# Patient Record
Sex: MALE | Race: WHITE | NOT HISPANIC OR LATINO | URBAN - METROPOLITAN AREA
[De-identification: names, ages, dates, MRNs, and addresses within clinical notes are randomized per-mention and may not be internally consistent; named-entity substitution may affect disease eponyms.]

---

## 2019-09-24 ENCOUNTER — INPATIENT (INPATIENT)
Facility: HOSPITAL | Age: 59
LOS: 0 days | Discharge: HOME | DRG: 247 | End: 2019-09-25
Attending: HOSPITALIST | Admitting: INTERNAL MEDICINE
Payer: SELF-PAY

## 2019-09-24 VITALS
HEART RATE: 63 BPM | RESPIRATION RATE: 16 BRPM | HEIGHT: 67 IN | WEIGHT: 154.98 LBS | SYSTOLIC BLOOD PRESSURE: 127 MMHG | TEMPERATURE: 98 F | OXYGEN SATURATION: 97 % | DIASTOLIC BLOOD PRESSURE: 75 MMHG

## 2019-09-24 DIAGNOSIS — Z98.890 OTHER SPECIFIED POSTPROCEDURAL STATES: Chronic | ICD-10-CM

## 2019-09-24 DIAGNOSIS — F17.200 NICOTINE DEPENDENCE, UNSPECIFIED, UNCOMPLICATED: ICD-10-CM

## 2019-09-24 DIAGNOSIS — I21.4 NON-ST ELEVATION (NSTEMI) MYOCARDIAL INFARCTION: ICD-10-CM

## 2019-09-24 PROCEDURE — 99223 1ST HOSP IP/OBS HIGH 75: CPT

## 2019-09-24 PROCEDURE — 99152 MOD SED SAME PHYS/QHP 5/>YRS: CPT | Mod: GC

## 2019-09-24 PROCEDURE — 92941 PRQ TRLML REVSC TOT OCCL AMI: CPT | Mod: RC,GC

## 2019-09-24 PROCEDURE — 92978 ENDOLUMINL IVUS OCT C 1ST: CPT | Mod: 26,RC,GC

## 2019-09-24 PROCEDURE — 93571 IV DOP VEL&/PRESS C FLO 1ST: CPT | Mod: 26,RC,GC

## 2019-09-24 PROCEDURE — 93458 L HRT ARTERY/VENTRICLE ANGIO: CPT | Mod: 26,59,GC

## 2019-09-24 PROCEDURE — 93010 ELECTROCARDIOGRAM REPORT: CPT | Mod: 76

## 2019-09-24 RX ORDER — ASCORBIC ACID 60 MG
500 TABLET,CHEWABLE ORAL DAILY
Refills: 0 | Status: DISCONTINUED | OUTPATIENT
Start: 2019-09-24 | End: 2019-09-25

## 2019-09-24 RX ORDER — ASPIRIN/CALCIUM CARB/MAGNESIUM 324 MG
81 TABLET ORAL DAILY
Refills: 0 | Status: DISCONTINUED | OUTPATIENT
Start: 2019-09-24 | End: 2019-09-25

## 2019-09-24 RX ORDER — SODIUM CHLORIDE 9 MG/ML
1000 INJECTION INTRAMUSCULAR; INTRAVENOUS; SUBCUTANEOUS
Refills: 0 | Status: DISCONTINUED | OUTPATIENT
Start: 2019-09-24 | End: 2019-09-25

## 2019-09-24 RX ORDER — CLOPIDOGREL BISULFATE 75 MG/1
75 TABLET, FILM COATED ORAL DAILY
Refills: 0 | Status: DISCONTINUED | OUTPATIENT
Start: 2019-09-25 | End: 2019-09-25

## 2019-09-24 RX ORDER — SODIUM CHLORIDE 9 MG/ML
3 INJECTION INTRAMUSCULAR; INTRAVENOUS; SUBCUTANEOUS EVERY 8 HOURS
Refills: 0 | Status: DISCONTINUED | OUTPATIENT
Start: 2019-09-24 | End: 2019-09-25

## 2019-09-24 RX ORDER — INFLUENZA VIRUS VACCINE 15; 15; 15; 15 UG/.5ML; UG/.5ML; UG/.5ML; UG/.5ML
0.5 SUSPENSION INTRAMUSCULAR ONCE
Refills: 0 | Status: COMPLETED | OUTPATIENT
Start: 2019-09-24 | End: 2019-09-24

## 2019-09-24 RX ORDER — NICOTINE POLACRILEX 2 MG
1 GUM BUCCAL DAILY
Refills: 0 | Status: DISCONTINUED | OUTPATIENT
Start: 2019-09-24 | End: 2019-09-25

## 2019-09-24 RX ORDER — THIAMINE MONONITRATE (VIT B1) 100 MG
1 TABLET ORAL
Qty: 0 | Refills: 0 | DISCHARGE

## 2019-09-24 RX ORDER — ATORVASTATIN CALCIUM 80 MG/1
80 TABLET, FILM COATED ORAL AT BEDTIME
Refills: 0 | Status: DISCONTINUED | OUTPATIENT
Start: 2019-09-24 | End: 2019-09-25

## 2019-09-24 RX ORDER — THIAMINE MONONITRATE (VIT B1) 100 MG
100 TABLET ORAL DAILY
Refills: 0 | Status: DISCONTINUED | OUTPATIENT
Start: 2019-09-24 | End: 2019-09-25

## 2019-09-24 RX ORDER — METOPROLOL TARTRATE 50 MG
25 TABLET ORAL
Refills: 0 | Status: DISCONTINUED | OUTPATIENT
Start: 2019-09-24 | End: 2019-09-25

## 2019-09-24 RX ORDER — ASCORBIC ACID 60 MG
1 TABLET,CHEWABLE ORAL
Qty: 0 | Refills: 0 | DISCHARGE

## 2019-09-24 RX ADMIN — SODIUM CHLORIDE 3 MILLILITER(S): 9 INJECTION INTRAMUSCULAR; INTRAVENOUS; SUBCUTANEOUS at 18:59

## 2019-09-24 RX ADMIN — Medication 25 MILLIGRAM(S): at 19:55

## 2019-09-24 RX ADMIN — Medication 1 PATCH: at 19:56

## 2019-09-24 RX ADMIN — SODIUM CHLORIDE 250 MILLILITER(S): 9 INJECTION INTRAMUSCULAR; INTRAVENOUS; SUBCUTANEOUS at 13:00

## 2019-09-24 RX ADMIN — SODIUM CHLORIDE 3 MILLILITER(S): 9 INJECTION INTRAMUSCULAR; INTRAVENOUS; SUBCUTANEOUS at 21:47

## 2019-09-24 RX ADMIN — ATORVASTATIN CALCIUM 80 MILLIGRAM(S): 80 TABLET, FILM COATED ORAL at 21:49

## 2019-09-24 NOTE — H&P ADULT - ASSESSMENT
NIGHT HOSPITALIST:   S/P cardiac catheterization by cardiology with resolved RIGHT hand oedema following procedure with intact pulses in the setting of patient with non ST elevation MI NIGHT HOSPITALIST:   S/P cardiac catheterization by cardiology with resolved RIGHT hand oedema following procedure with intact pulses in the setting of patient with non ST elevation MI, with heavy tobacco use now on Nicoderm patch.  Likely has COPD, but at present S/P cardiac cath with no clinical evidence of COPD exacerbation.   Will continue with patient's Plavix, ASA, full dose Lipitor and lopressor 25 BID.    For follow up by interventional cardiology in the AM.   Patient will need to arrange for an outpatient internist...

## 2019-09-24 NOTE — H&P ADULT - NSHPPHYSICALEXAM_GEN_ALL_CORE
Physical exam with a middle aged, chronically ill appearing M in no acute distress.    Afebrile.  HR  60  RR 14.  BP  139/77  95% on RA    HEENT< PERRL< EOMI, oropharynx clear  Neck supple   Chest clear  Cor s1 s2  Abdomen soft nontender, normal bowel sounds, no rebound  Ext with RIGHT hand S/P radial approach, full pulses, resolved oedema.  skin dry.  Neurologic exam AxOx3.  Speech fluent.  Cognition intact.  UE/LE 5/5.  NO SI/HI.

## 2019-09-24 NOTE — CHART NOTE - NSCHARTNOTEFT_GEN_A_CORE
s/p JORGE x2 (prox and mRCA 95% lesion), 60% in pLAD, mild disease in LCx  Right radial band site no hematoma or ecchymosis, VSS  pt agreed life style modification includes smoking cessation, ASA, Plavix, Statin and f/u care.

## 2019-09-24 NOTE — H&P ADULT - NSHPLABSRESULTS_GEN_ALL_CORE
Labs from [facility] reviewed:    9/23/19 :   WBC 10.3.  Hgb 14.7.  Platelets of 262K.    Urine tox screen nil.    9/22/19 with K 4.2.  Cr 1.3.    Chest radiograph report from [facility] with no focal consolidation from 9/22/19    EKG tracing reviewed with NSR at 65 with nonspecific T changes.

## 2019-09-24 NOTE — H&P ADULT - NSHPREVIEWOFSYSTEMS_GEN_ALL_CORE
No chest pain/pressure.  NO palpitations.  No HA, no focal weakness.  NO hand pain, wrist pain.  NO back pain, no tearing back pain.  No abdominal pain, no red blood per rectum or melena.    No weight loss or anorexia.  No dysuria, no hematuria.  NO rash.  No joint pain.  No SI/HI.

## 2019-09-24 NOTE — H&P ADULT - ATTENDING COMMENTS
NIGHT HOSPITALIST:   Patient / family aware of course and agree with plan/care as above.  Emotional support provided to patient.  Care reviewed with covering NP/PAElsy.  Care assumed by the DAY HOSPITALIST.    Adrián Perez MD  [phone]

## 2019-09-24 NOTE — H&P ADULT - NSHPSOCIALHISTORY_GEN_ALL_CORE
1-2 drinks twice a month.  Negative CAGE screen.   Still smokes 1.5 packs daily but agrees to nicotine patch and will continue abstinence.  .  Works as an .  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

## 2019-09-24 NOTE — H&P ADULT - PROBLEM SELECTOR PLAN 1
S/P cardiac cath--follow up on official report.  Cardiology to follow.  On ASA, Lipitor, beta blocker.

## 2019-09-24 NOTE — H&P ADULT - HISTORY OF PRESENT ILLNESS
NIGHT HOSPITALIST:  Patient UNKNOWN to me previously, assigned to me at this point by Dr. Moore of the Interventional Cardiology group at [place] to admit this 60 y/o M--S/P cardiac catheterization RIGHT radial approach following initial presentation at [facility] on 9/22/19 for chest pressure but had started 5 days earlier with patient assuming muscular pain--patient does not see a regular physician.  Patient with elevated troponin assay and transferred to [place] today and is S/P cardiac cath.   Patient with apparently transient RIGHT hand oedema following the cardiac cath, now resolved. NIGHT HOSPITALIST:  Patient UNKNOWN to me previously, assigned to me at this point by Dr. JESSIE Moore of the Interventional Cardiology group at [place] to admit this 58 y/o M--S/P cardiac catheterization RIGHT radial approach following initial presentation at [facility] on 9/22/19 for chest pressure but had started 5 days earlier with patient assuming muscular pain--patient does not see a regular physician.  Patient with elevated troponin assay and transferred to [place] today and is S/P cardiac cath.   Patient with apparently transient RIGHT hand oedema following the cardiac cath, now resolved. NIGHT HOSPITALIST:  Patient UNKNOWN to me previously, assigned to me at this point by Dr. JESSIE Moore of the Interventional Cardiology group at [place] to admit this 60 y/o M--records from [facility] reviewed--S/P cardiac catheterization RIGHT radial approach following initial presentation at [facility] on 9/22/19 for chest pressure but had started 5 days earlier with patient assuming muscular pain--patient does not see a regular physician.  Patient with elevated troponin assay and transferred to [place] today and is S/P cardiac cath.   Patient with apparently transient RIGHT hand oedema following the cardiac cath, now resolved. NIGHT HOSPITALIST:  Patient UNKNOWN to me previously, assigned to me at this point by Dr. JESSIE Moore of the Interventional Cardiology group at [place] to admit this 58 y/o M--records from [facility] reviewed--S/P cardiac catheterization RIGHT radial approach following initial presentation at [facility] on 9/22/19 for chest pressure but had started 5 days earlier with patient assuming muscular pain--patient does not see a regular physician.  Patient with elevated troponin assay and transferred to [place] today and is S/P cardiac cath.   Patient with apparently transient RIGHT hand oedema following the cardiac cath, now resolved.    [facility] reviewed S/P cardiac cath.

## 2019-09-24 NOTE — H&P CARDIOLOGY - HISTORY OF PRESENT ILLNESS
60 yo male no implantable device with no significant pMHx (not seen doctor about 10 years), current smoker 1.5p/45 years) presented to [facility] ER on 9/22 am for 15/10 heavy left sided chest pain radiated to left shoulder and back. Pt reports this pain comes & goes, actually started on 9/17 thought is was muscular pain went to urgent care on 9/21 advised to go ER. Then this pain got worse Saturday night to Sunday am, decided to go ER. 58 yo male no implantable device with no significant pMHx (not seen doctor about 10 years), current smoker 1.5p/45 years) presented to [facility] ER on 9/22 am for 15/10 heavy left sided chest pain radiated to left shoulder and back. Pt reports this pain comes & goes, actually started on 9/17 thought is was muscular pain went to urgent care on 9/21 advised to go ER. Then this pain got worse Saturday night to Sunday am, decided to go ER. Pt had elevated troponin, now transferred here for cardiac cath. Pt reports his pain got relieved with medication given in ER, however, this pain still comes and goes, had pain last night and this am which lasted half a minutes.   Of note; EMS endorsed that pt was under CIWA protocol, however, pt states very clearly he only drinks 1-2 glasses beer or wine on the weekend. Last drink was more than a week ago. Also no ETOH records or CIWA protocol found on the transfer record.     Total Chol 315, , HDL 37, ,   Echo on 9/23/19: EF 60%, trivial pericardial effusion, mild MR, mild thickening of the anterior and posterior mitral valve leaflets.   CXR 9/22/19: No focal consolidation, left shoulder xray: no fx or dislocation  9/23/19 WBC 10.3, H/H 14/44, plt 262 Troponin 0.285<-0.308<-0.127, , CKMB 2.1, BUN/Cr 10/1.0 58 yo male no implantable device with no significant pMHx (not seen doctor about 10 years), current smoker 1.5p/45 years) presented to [facility] ER on 9/22 am for 15/10 heavy left sided chest pain radiated to left shoulder and back. Pt reports this pain comes & goes, actually started on 9/17 thought is was muscular pain went to urgent care on 9/21 advised to go ER. Then this pain got worse Saturday night to Sunday am, decided to go ER. Pt had elevated troponin, now transferred here for cardiac cath. Pt reports his pain got relieved with medication given in ER, however, this pain still comes and goes, had pain last night and this am which lasted half a minutes.   Of note; EMS endorsed that pt was under CIWA protocol, however, pt states very clearly he only drinks 1-2 glasses beer or wine on the weekends. Last drink was more than a week ago. Also no ETOH records or CIWA protocol found on the chart ([facility]) except for transfer center record. Confirmed with Dr. Lazaro Caraballo cardiology fellow on pt's care), pt was not under CIWA protocol.   Total Chol 315, , HDL 37, ,   Echo on 9/23/19: EF 60%, trivial pericardial effusion, mild MR, mild thickening of the anterior and posterior mitral valve leaflets.   CXR 9/22/19: No focal consolidation, left shoulder xray: no fx or dislocation  9/23/19 WBC 10.3, H/H 14/44, plt 262 Troponin 0.285<-0.308<-0.127, , CKMB 2.1, BUN/Cr 10/1.0

## 2019-09-25 ENCOUNTER — TRANSCRIPTION ENCOUNTER (OUTPATIENT)
Age: 59
End: 2019-09-25

## 2019-09-25 VITALS
WEIGHT: 158.73 LBS | HEART RATE: 74 BPM | OXYGEN SATURATION: 92 % | RESPIRATION RATE: 18 BRPM | SYSTOLIC BLOOD PRESSURE: 130 MMHG | DIASTOLIC BLOOD PRESSURE: 81 MMHG | TEMPERATURE: 98 F

## 2019-09-25 PROBLEM — Z00.00 ENCOUNTER FOR PREVENTIVE HEALTH EXAMINATION: Status: ACTIVE | Noted: 2019-09-25

## 2019-09-25 LAB
ANION GAP SERPL CALC-SCNC: 12 MMOL/L — SIGNIFICANT CHANGE UP (ref 5–17)
APPEARANCE UR: CLEAR — SIGNIFICANT CHANGE UP
BILIRUB UR-MCNC: NEGATIVE — SIGNIFICANT CHANGE UP
BUN SERPL-MCNC: 22 MG/DL — SIGNIFICANT CHANGE UP (ref 7–23)
CALCIUM SERPL-MCNC: 9.1 MG/DL — SIGNIFICANT CHANGE UP (ref 8.4–10.5)
CHLORIDE SERPL-SCNC: 106 MMOL/L — SIGNIFICANT CHANGE UP (ref 96–108)
CO2 SERPL-SCNC: 21 MMOL/L — LOW (ref 22–31)
COLOR SPEC: SIGNIFICANT CHANGE UP
CREAT SERPL-MCNC: 1.08 MG/DL — SIGNIFICANT CHANGE UP (ref 0.5–1.3)
DIFF PNL FLD: NEGATIVE — SIGNIFICANT CHANGE UP
GLUCOSE SERPL-MCNC: 99 MG/DL — SIGNIFICANT CHANGE UP (ref 70–99)
GLUCOSE UR QL: NEGATIVE — SIGNIFICANT CHANGE UP
HCT VFR BLD CALC: 46.3 % — SIGNIFICANT CHANGE UP (ref 39–50)
HCV AB S/CO SERPL IA: 0.09 S/CO — SIGNIFICANT CHANGE UP (ref 0–0.99)
HCV AB SERPL-IMP: SIGNIFICANT CHANGE UP
HGB BLD-MCNC: 15.5 G/DL — SIGNIFICANT CHANGE UP (ref 13–17)
KETONES UR-MCNC: SIGNIFICANT CHANGE UP
LEUKOCYTE ESTERASE UR-ACNC: NEGATIVE — SIGNIFICANT CHANGE UP
MCHC RBC-ENTMCNC: 30.9 PG — SIGNIFICANT CHANGE UP (ref 27–34)
MCHC RBC-ENTMCNC: 33.5 GM/DL — SIGNIFICANT CHANGE UP (ref 32–36)
MCV RBC AUTO: 92.3 FL — SIGNIFICANT CHANGE UP (ref 80–100)
NITRITE UR-MCNC: NEGATIVE — SIGNIFICANT CHANGE UP
PH UR: 6 — SIGNIFICANT CHANGE UP (ref 5–8)
PLATELET # BLD AUTO: 255 K/UL — SIGNIFICANT CHANGE UP (ref 150–400)
POTASSIUM SERPL-MCNC: 3.7 MMOL/L — SIGNIFICANT CHANGE UP (ref 3.5–5.3)
POTASSIUM SERPL-SCNC: 3.7 MMOL/L — SIGNIFICANT CHANGE UP (ref 3.5–5.3)
PROT UR-MCNC: NEGATIVE — SIGNIFICANT CHANGE UP
RBC # BLD: 5.01 M/UL — SIGNIFICANT CHANGE UP (ref 4.2–5.8)
RBC # FLD: 12.1 % — SIGNIFICANT CHANGE UP (ref 10.3–14.5)
SODIUM SERPL-SCNC: 139 MMOL/L — SIGNIFICANT CHANGE UP (ref 135–145)
SP GR SPEC: 1.02 — SIGNIFICANT CHANGE UP (ref 1.01–1.02)
UROBILINOGEN FLD QL: NEGATIVE — SIGNIFICANT CHANGE UP
WBC # BLD: 16.3 K/UL — HIGH (ref 3.8–10.5)
WBC # FLD AUTO: 16.3 K/UL — HIGH (ref 3.8–10.5)

## 2019-09-25 PROCEDURE — 93571 IV DOP VEL&/PRESS C FLO 1ST: CPT | Mod: RC

## 2019-09-25 PROCEDURE — C1874: CPT

## 2019-09-25 PROCEDURE — 93010 ELECTROCARDIOGRAM REPORT: CPT

## 2019-09-25 PROCEDURE — C1769: CPT

## 2019-09-25 PROCEDURE — 93005 ELECTROCARDIOGRAM TRACING: CPT

## 2019-09-25 PROCEDURE — C9606: CPT | Mod: RC

## 2019-09-25 PROCEDURE — C1753: CPT

## 2019-09-25 PROCEDURE — 85027 COMPLETE CBC AUTOMATED: CPT

## 2019-09-25 PROCEDURE — C1887: CPT

## 2019-09-25 PROCEDURE — 80048 BASIC METABOLIC PNL TOTAL CA: CPT

## 2019-09-25 PROCEDURE — 99239 HOSP IP/OBS DSCHRG MGMT >30: CPT

## 2019-09-25 PROCEDURE — 99153 MOD SED SAME PHYS/QHP EA: CPT

## 2019-09-25 PROCEDURE — C1725: CPT

## 2019-09-25 PROCEDURE — 93458 L HRT ARTERY/VENTRICLE ANGIO: CPT | Mod: 59

## 2019-09-25 PROCEDURE — 99152 MOD SED SAME PHYS/QHP 5/>YRS: CPT

## 2019-09-25 PROCEDURE — 86803 HEPATITIS C AB TEST: CPT

## 2019-09-25 PROCEDURE — C1894: CPT

## 2019-09-25 PROCEDURE — 92978 ENDOLUMINL IVUS OCT C 1ST: CPT | Mod: RC

## 2019-09-25 PROCEDURE — 81003 URINALYSIS AUTO W/O SCOPE: CPT

## 2019-09-25 PROCEDURE — 99222 1ST HOSP IP/OBS MODERATE 55: CPT

## 2019-09-25 RX ORDER — NICOTINE POLACRILEX 2 MG
1 GUM BUCCAL
Qty: 14 | Refills: 0
Start: 2019-09-25 | End: 2019-10-08

## 2019-09-25 RX ORDER — ATORVASTATIN CALCIUM 80 MG/1
1 TABLET, FILM COATED ORAL
Qty: 30 | Refills: 0
Start: 2019-09-25 | End: 2019-10-24

## 2019-09-25 RX ORDER — ATORVASTATIN CALCIUM 80 MG/1
1 TABLET, FILM COATED ORAL
Qty: 0 | Refills: 0 | DISCHARGE

## 2019-09-25 RX ORDER — CLOPIDOGREL BISULFATE 75 MG/1
1 TABLET, FILM COATED ORAL
Qty: 0 | Refills: 0 | DISCHARGE

## 2019-09-25 RX ORDER — ASPIRIN/CALCIUM CARB/MAGNESIUM 324 MG
1 TABLET ORAL
Qty: 30 | Refills: 0
Start: 2019-09-25 | End: 2019-10-24

## 2019-09-25 RX ORDER — ASPIRIN/CALCIUM CARB/MAGNESIUM 324 MG
1 TABLET ORAL
Qty: 0 | Refills: 0 | DISCHARGE

## 2019-09-25 RX ORDER — METOPROLOL TARTRATE 50 MG
1 TABLET ORAL
Qty: 0 | Refills: 0 | DISCHARGE

## 2019-09-25 RX ORDER — CLOPIDOGREL BISULFATE 75 MG/1
1 TABLET, FILM COATED ORAL
Qty: 30 | Refills: 0
Start: 2019-09-25 | End: 2019-10-24

## 2019-09-25 RX ADMIN — Medication 81 MILLIGRAM(S): at 09:28

## 2019-09-25 RX ADMIN — Medication 1 PATCH: at 09:26

## 2019-09-25 RX ADMIN — Medication 100 MILLIGRAM(S): at 06:24

## 2019-09-25 RX ADMIN — CLOPIDOGREL BISULFATE 75 MILLIGRAM(S): 75 TABLET, FILM COATED ORAL at 09:28

## 2019-09-25 RX ADMIN — SODIUM CHLORIDE 3 MILLILITER(S): 9 INJECTION INTRAMUSCULAR; INTRAVENOUS; SUBCUTANEOUS at 06:19

## 2019-09-25 RX ADMIN — Medication 500 MILLIGRAM(S): at 09:28

## 2019-09-25 RX ADMIN — Medication 25 MILLIGRAM(S): at 06:24

## 2019-09-25 NOTE — DISCHARGE NOTE PROVIDER - CARE PROVIDERS DIRECT ADDRESSES
[email or web address]

## 2019-09-25 NOTE — DISCHARGE NOTE PROVIDER - CARE PROVIDER_API CALL
Crispin Byrd)  Internal Medicine  [address] [place] [address]  Phone: [phone]  Fax: [phone]  Follow Up Time: 1 week

## 2019-09-25 NOTE — DISCHARGE NOTE NURSING/CASE MANAGEMENT/SOCIAL WORK - NSDCPEWEB_GEN_ALL_CORE
[facility] for Tobacco Control website --- [email or web address] website --- [email or web address]

## 2019-09-25 NOTE — DISCHARGE NOTE NURSING/CASE MANAGEMENT/SOCIAL WORK - NSDCPEEMAIL_GEN_ALL_CORE
[facility] for Tobacco Control email [email or web address]

## 2019-09-25 NOTE — CONSULT NOTE ADULT - ASSESSMENT
59M w NSTEMI s/p PCI to RCA    - cont dapt. compliance stressed  - cont statin  - echo from [facility] with normal LV function  - Check ekg today  - change metoprolol to long acting toprol 50mg Qday on dc  - eventually will need ACE/ARB  - smoking cessation   - Monitor and replete electrolytes. Keep K>4.0 and Mg>2.0.  - Further cardiac workup will depend on clinical course.   DC planning per primary team. If dc'd can f/u with our office within 2 weeks. Information given to pt for followup.

## 2019-09-25 NOTE — CHART NOTE - NSCHARTNOTEFT_GEN_A_CORE
patient seen and examined. no acute complaints. Left hand swelling improved. good radial and ulnar pulses.     59M smoker with no significant PMHx presented from OSH with chest pain found to have NSTEMI. patient had cath via R radial artery found to have 95%stenosis RCA and 60%stenosis proxLAD s/p JORGE to RCA. patient developed Right hand swelling post cath which improved with hand elevation. Patient had TTE in OSH with EF 60%. patient discharged with asa, plavix, statin, metoprolol and told to follow up with cardiology Dr. Byrd in 1-2 weeks. patient will need to be started on Ace/arb. patient counseled on smoking cessation.     discharge time - 35 minutes  Dr. M. Luke  Medicine Hospitalist  [phone]

## 2019-09-25 NOTE — CONSULT NOTE ADULT - SUBJECTIVE AND OBJECTIVE BOX
CHIEF COMPLAINT: Patient is a 59y old  Male who presents with a chief complaint of S/P transfer from [facility] for urgent cardiac catheterization. (25 Sep 2019 08:51)      HPI:  58 y/o M smoker with no sig PMH presented to [facility] initially with CP and found to have an NSTEMI. Transfered to .   He states that  chest pressure but had started 5 days earlier with patient assuming muscular pain. It persisted and intensified so went to [facility]. Pt had elevated troponin.  He was transferred to [facility] for cardiac cath for NSTEMI and underwent PCI to RCA.   Currently Denies any chest pain, dyspnea, palpitations, PND, orthopnea, near syncope, syncope, lower extremity edema, stroke like symptoms.     Echo on 9/23/19 at [facility]: EF 60%, trivial pericardial effusion, mild MR, mild thickening of the anterior and posterior mitral valve leaflets.   CXR 9/22/19: No focal consolidation, left shoulder xray: no fx or dislocation       REVIEW OF SYSTEMS:   All other review of systems are negative unless indicated above    PAST MEDICAL & SURGICAL HISTORY:  Bronchitis  Cough  Smoker  HLD (hyperlipidemia)  H/O hernia repair  S/P foot surgery, left      SOCIAL HISTORY:  No tobacco, ethanol, or drug abuse.    FAMILY HISTORY:  Family history of lung cancer (Mother)    No family history of acute MI or sudden cardiac death.    MEDICATIONS  (STANDING):  ascorbic acid 500 milliGRAM(s) Oral daily  aspirin enteric coated 81 milliGRAM(s) Oral daily  atorvastatin 80 milliGRAM(s) Oral at bedtime  clopidogrel Tablet 75 milliGRAM(s) Oral daily  influenza   Vaccine 0.5 milliLiter(s) IntraMuscular once  metoprolol tartrate 25 milliGRAM(s) Oral two times a day  nicotine - 21 mG/24Hr(s) Patch 1 patch Transdermal daily  sodium chloride 0.9% lock flush 3 milliLiter(s) IV Push every 8 hours  sodium chloride 0.9%. 1000 milliLiter(s) (250 mL/Hr) IV Continuous <Continuous>  thiamine 100 milliGRAM(s) Oral daily    MEDICATIONS  (PRN):      Allergies    No Known Allergies    Intolerances        Home meds:  Home Medications:  ascorbic acid 500 mg oral tablet: 1 tab(s) orally once a day Hospital (24 Sep 2019 11:02)  Aspirin Enteric Coated 81 mg oral delayed release tablet: 1 tab(s) orally once a day Hospital (24 Sep 2019 11:02)  atorvastatin 80 mg oral tablet: 1 tab(s) orally once a day    Hospital (24 Sep 2019 11:02)  metoprolol tartrate 25 mg oral tablet: 1 tab(s) orally 2 times a day Hospital (24 Sep 2019 11:02)  Plavix 75 mg oral tablet: 1 tab(s) orally once a day Hospital (24 Sep 2019 11:02)  thiamine 100 mg oral tablet: 1 tab(s) orally once a day Hospital (24 Sep 2019 11:02)        VITAL SIGNS:   Vital Signs Last 24 Hrs  T(C): 36.5 (25 Sep 2019 05:18), Max: 36.9 (24 Sep 2019 18:53)  T(F): 97.7 (25 Sep 2019 05:18), Max: 98.5 (24 Sep 2019 18:53)  HR: 74 (25 Sep 2019 05:18) (60 - 74)  BP: 130/81 (25 Sep 2019 05:18) (127/75 - 149/79)  BP(mean): 92 (24 Sep 2019 10:35) (92 - 92)  RR: 18 (25 Sep 2019 05:18) (16 - 18)  SpO2: 92% (25 Sep 2019 05:18) (92% - 97%)    I&O's Summary    24 Sep 2019 07:01  -  25 Sep 2019 07:00  --------------------------------------------------------  IN: 120 mL / OUT: 550 mL / NET: -430 mL        On Exam:  TELE: SR  Constitutional: NAD, awake and alert, well-developed  HEENT: Moist Mucous Membranes, Anicteric  Pulmonary: Non-labored, breath sounds are clear bilaterally, No wheezing, rales or rhonchi  Cardiovascular: Regular, S1 and S2, No murmurs, rubs, gallops or clicks 2+ radial pulse  Gastrointestinal: Bowel Sounds present, soft, nontender.   Lymph: No peripheral edema. No lymphadenopathy.  Skin: No visible rashes or ulcers. mild ecchymosis on right wrist.   Psych:  Mood & affect appropriate    LABS: All Labs Reviewed:                        15.5   16.3  )-----------( 255      ( 25 Sep 2019 06:37 )             46.3     25 Sep 2019 06:37    139    |  106    |  22     ----------------------------<  99     3.7     |  21     |  1.08     Ca    9.1        25 Sep 2019 06:37            Blood Culture:         RADIOLOGY:

## 2019-09-25 NOTE — DISCHARGE NOTE NURSING/CASE MANAGEMENT/SOCIAL WORK - PATIENT PORTAL LINK FT
You can access the FollowMyHealth Patient Portal offered by [facility] by registering at the following website: [email or web address]. By joining [employer] [facility]’s FollowMyHealth portal, you will also be able to view your health information using other applications (apps) compatible with our system.

## 2019-09-25 NOTE — DISCHARGE NOTE PROVIDER - NSDCCPCAREPLAN_GEN_ALL_CORE_FT
PRINCIPAL DISCHARGE DIAGNOSIS  Diagnosis: MI, acute, non ST segment elevation  Assessment and Plan of Treatment: MI, acute, non ST segment elevation- Take meds as prescribed. Follow up with cardiology in 1 week. Return to the hospital for ches tpain, dizziness, difficulty breathing or worsens. Cath site has improved, follow up with Dr Byrd.      SECONDARY DISCHARGE DIAGNOSES  Diagnosis: Smoker  Assessment and Plan of Treatment: Smoker-Nicoderm Patch and follow up with Dr Byrd for taper PRINCIPAL DISCHARGE DIAGNOSIS  Diagnosis: MI, acute, non ST segment elevation  Assessment and Plan of Treatment: MI, acute, non ST segment elevation- Take meds as prescribed. Follow up with cardiology in 1 week. Return to the hospital for ches tpain, dizziness, difficulty breathing or worsens. Cath site has improved, follow up with Dr Byrd. Outpatient Echo.      SECONDARY DISCHARGE DIAGNOSES  Diagnosis: Smoker  Assessment and Plan of Treatment: Smoker-Nicoderm Patch and follow up with Dr Byrd for taper

## 2019-09-25 NOTE — PROGRESS NOTE ADULT - ASSESSMENT
60 yo male no implantable device with no significant pMHx (not seen doctor about 10 years), current smoker 1.5p/45 years) presented to [facility] ER on 9/22 am for 15/10 heavy left sided chest pain radiated to left shoulder and back. Pt reports this pain comes & goes, actually started on 9/17 thought is was muscular pain went to urgent care on 9/21 advised to go ER. Then this pain got worse Saturday night to Sunday am, decided to go ER. Pt had elevated troponin.  He was transferred to [facility] for cardiac cath for NSTEMI and underwent PCI to RCA.     #NSTEMI sp/ cath with PCI to RCA  - C/w ASA and plavix for one year on d/c   - C/w atorvastatin 80 mg  - C/w metoprolol. Consider starting ACEi prior to d/c  - Otherwise no further interventions planned at this time.   - Pt states that he will follow up with Dr. Byrd after d/c     Simona Severino MD  Cardiology Fellow - PGY 5  Text or Call: [phone]  For all New Consults and Questions:  [email or web address]   Login: [employer]

## 2019-09-25 NOTE — DISCHARGE NOTE PROVIDER - HOSPITAL COURSE
58 y/o M smoker with no sig PMH presented to [facility] initially with CP and found to have an NSTEMI. Transfered to .     He states that  chest pressure but had started 5 days earlier with patient assuming muscular pain. It persisted and intensified so went to [facility]. Pt had elevated troponin.  He was transferred to [facility] for cardiac cath for NSTEMI and underwent PCI to RCA.     Currently Denies any chest pain, dyspnea, palpitations, PND, orthopnea, near syncope, syncope, lower extremity edema, stroke like symptoms.         Echo on 9/23/19 at [facility]: EF 60%, trivial pericardial effusion, mild MR, mild thickening of the anterior and posterior mitral valve leaflets.     CXR 9/22/19: No focal consolidation, left shoulder xray: no fx or dislocation         Cardiology Dr Byrd     NSTEMI s/p PCI to RCA    - cont dapt. compliance stressed    - cont statin    - echo from [facility] with normal LV function    - change metoprolol to long acting toprol 50mg Qday on dc    - eventually will need ACE/ARB    - smoking cessation         Follow up with cardiology in office within 2 weeks. Information given to pt for followup.     Cleared for discharge by Dr Herbert 59M smoker with no significant PMHx presented from OSH with chest pain found to have NSTEMI. patient had cath via R radial artery found to have 95%stenosis RCA and 60%stenosis proxLAD s/p JORGE to RCA. patient developed Right hand swelling post cath which improved with hand elevation. Patient had TTE in OSH with EF 60%. patient discharged with asa, plavix, statin, metoprolol and told to follow up with cardiology Dr. Byrd in 1-2 weeks. patient will need to be started on Ace/arb. patient counseled on smoking cessation.

## 2019-09-26 PROBLEM — E78.5 HYPERLIPIDEMIA, UNSPECIFIED: Chronic | Status: ACTIVE | Noted: 2019-09-24

## 2019-09-26 PROBLEM — J40 BRONCHITIS, NOT SPECIFIED AS ACUTE OR CHRONIC: Chronic | Status: ACTIVE | Noted: 2019-09-24

## 2019-09-26 PROBLEM — F17.200 NICOTINE DEPENDENCE, UNSPECIFIED, UNCOMPLICATED: Chronic | Status: ACTIVE | Noted: 2019-09-24

## 2019-09-26 PROBLEM — R05 COUGH: Chronic | Status: ACTIVE | Noted: 2019-09-24

## 2019-09-26 RX ORDER — METOPROLOL TARTRATE 50 MG
1 TABLET ORAL
Qty: 30 | Refills: 0
Start: 2019-09-26 | End: 2019-10-25

## 2019-10-08 ENCOUNTER — APPOINTMENT (OUTPATIENT)
Dept: CARDIOLOGY | Facility: CLINIC | Age: 59
End: 2019-10-08
Payer: SELF-PAY

## 2019-10-08 ENCOUNTER — NON-APPOINTMENT (OUTPATIENT)
Age: 59
End: 2019-10-08

## 2019-10-08 VITALS
OXYGEN SATURATION: 98 % | BODY MASS INDEX: 25.27 KG/M2 | WEIGHT: 161 LBS | DIASTOLIC BLOOD PRESSURE: 83 MMHG | SYSTOLIC BLOOD PRESSURE: 129 MMHG | HEIGHT: 67 IN | HEART RATE: 60 BPM

## 2019-10-08 DIAGNOSIS — Z78.9 OTHER SPECIFIED HEALTH STATUS: ICD-10-CM

## 2019-10-08 PROCEDURE — 99214 OFFICE O/P EST MOD 30 MIN: CPT

## 2019-10-08 PROCEDURE — 93000 ELECTROCARDIOGRAM COMPLETE: CPT

## 2019-10-08 RX ORDER — ASPIRIN ENTERIC COATED TABLETS 81 MG 81 MG/1
81 TABLET, DELAYED RELEASE ORAL
Qty: 90 | Refills: 3 | Status: ACTIVE | COMMUNITY
Start: 1900-01-01 | End: 1900-01-01

## 2019-10-08 NOTE — HISTORY OF PRESENT ILLNESS
[FreeTextEntry1] : 58 y/o M smoker with CAD s/p NSTEMI in 9/2019 s/p PCI to RCA, HTN, nl LV function presents for a followup visit. \par \par In 9/2019 he initially presented to [facility] initially with CP and found to have an NSTEMI. He was transferred to [facility] for cardiac cath for NSTEMI and underwent PCI to RCA.\par Echo on 9/23/19 at [facility]: EF 60%, trivial pericardial effusion, mild MR, mild\par thickening of the anterior and posterior mitral valve leaflets.\par \par He is now here for an initial followup visit. \par He   denies any chest pain, PND, orthopnea, lower extremity edema, near syncope, syncope, strokelike symptoms. He has been fatigued since leaving hospital. He ahs some brusing in the right radial site. No hand numbness. He is compliant with his medications. He does not have any insurances.

## 2019-10-08 NOTE — DISCUSSION/SUMMARY
[FreeTextEntry1] : 59 year man with a history as listed presents for a followup cardiac evaluation. \par Yonny is doing well overall. He denies any anginal symptoms. Clinically he is euvolemic on exam. His EKG did not reveal any significant ischemic changes. He had an echo at [facility] in 9/2019 that showed normal systolic LV function without any significant other findings, including no significant valvular disease. \par He has PCI to his RCA with a JORGE on  9/2019. He will continue DAPT for at least 3 months and ideally for 6 months. He will continue Lipitor 80mg HS. He will continue with statin therapy to achieve maintain goal LDL<100 or ideally <70. \par He has a good right radial pulse. Warm compress for bruising. \par Smoking cessation counseling was performed for >3 minutes. Continue with the Nicotine patch. Exercise and diet counseling was performed in order to reduce her future cardiovascular risk. He will followup with me in 3 months or sooner if necessary. He will try to get insurance. \par \par

## 2019-10-08 NOTE — PHYSICAL EXAM
[Well Groomed] : well groomed [General Appearance - In No Acute Distress] : no acute distress [Normal Conjunctiva] : the conjunctiva exhibited no abnormalities [Eyelids - No Xanthelasma] : the eyelids demonstrated no xanthelasmas [Normal Oral Mucosa] : normal oral mucosa [No Oral Pallor] : no oral pallor [Normal Jugular Venous A Waves Present] : normal jugular venous A waves present [No Oral Cyanosis] : no oral cyanosis [Normal Jugular Venous V Waves Present] : normal jugular venous V waves present [No Jugular Venous Quach A Waves] : no jugular venous quach A waves [Exaggerated Use Of Accessory Muscles For Inspiration] : no accessory muscle use [Respiration, Rhythm And Depth] : normal respiratory rhythm and effort [Auscultation Breath Sounds / Voice Sounds] : lungs were clear to auscultation bilaterally [Normal Rate] : normal [Rhythm Regular] : regular [Normal S1] : normal S1 [Normal S2] : normal S2 [No Murmur] : no murmurs heard [No Pitting Edema] : no pitting edema present [2+] : left 2+ [Abdomen Tenderness] : non-tender [Abdomen Soft] : soft [Abdomen Mass (___ Cm)] : no abdominal mass palpated [Abnormal Walk] : normal gait [Gait - Sufficient For Exercise Testing] : the gait was sufficient for exercise testing [Cyanosis, Localized] : no localized cyanosis [Nail Clubbing] : no clubbing of the fingernails [Skin Color & Pigmentation] : normal skin color and pigmentation [Petechial Hemorrhages (___cm)] : no petechial hemorrhages [] : no rash [Skin Lesions] : no skin lesions [No Venous Stasis] : no venous stasis [No Skin Ulcers] : no skin ulcer [No Xanthoma] : no  xanthoma was observed [Oriented To Time, Place, And Person] : oriented to person, place, and time [Affect] : the affect was normal [Mood] : the mood was normal [No Anxiety] : not feeling anxious [Right Carotid Bruit] : no bruit heard over the right carotid [Left Carotid Bruit] : no bruit heard over the left carotid [Bruit] : no bruit heard [FreeTextEntry1] : + ecchymosis on right forearm

## 2019-11-08 ENCOUNTER — TRANSCRIPTION ENCOUNTER (OUTPATIENT)
Age: 59
End: 2019-11-08

## 2020-01-13 ENCOUNTER — APPOINTMENT (OUTPATIENT)
Dept: CARDIOLOGY | Facility: CLINIC | Age: 60
End: 2020-01-13
Payer: SELF-PAY

## 2020-01-13 ENCOUNTER — NON-APPOINTMENT (OUTPATIENT)
Age: 60
End: 2020-01-13

## 2020-01-13 VITALS
SYSTOLIC BLOOD PRESSURE: 129 MMHG | BODY MASS INDEX: 26.06 KG/M2 | OXYGEN SATURATION: 95 % | DIASTOLIC BLOOD PRESSURE: 79 MMHG | HEART RATE: 70 BPM | WEIGHT: 166 LBS | HEIGHT: 67 IN

## 2020-01-13 PROCEDURE — 99213 OFFICE O/P EST LOW 20 MIN: CPT

## 2020-01-13 PROCEDURE — 93000 ELECTROCARDIOGRAM COMPLETE: CPT

## 2020-01-13 NOTE — DISCUSSION/SUMMARY
[FreeTextEntry1] : 59 year man with a history as listed presents for a followup cardiac evaluation. \par \par Yonny is doing well overall. He denies any anginal symptoms. Clinically he is euvolemic on exam. His EKG did not reveal any significant ischemic changes. He had an echo at [facility] in 9/2019 that showed normal systolic LV function without any significant other findings, including no significant valvular disease. \par He has PCI to his RCA with a JORGE on  9/2019. He will continue DAPT for at least till  April and will likely stay on till Sept 2020. \par He will continue Toprol 25mg Qday. \par He will continue Lipitor 80mg HS. He will continue with statin therapy to achieve maintain goal LDL<100 or ideally <70. \par Smoking cessation counseling was performed for >3 minutes.  \par Exercise and diet counseling was performed in order to reduce her future cardiovascular risk. \par He will followup with me in 3 months or sooner if necessary.  \par \par

## 2020-01-13 NOTE — PHYSICAL EXAM
[Well Groomed] : well groomed [General Appearance - In No Acute Distress] : no acute distress [Normal Conjunctiva] : the conjunctiva exhibited no abnormalities [Eyelids - No Xanthelasma] : the eyelids demonstrated no xanthelasmas [Normal Oral Mucosa] : normal oral mucosa [Normal Jugular Venous A Waves Present] : normal jugular venous A waves present [No Oral Cyanosis] : no oral cyanosis [No Oral Pallor] : no oral pallor [Normal Jugular Venous V Waves Present] : normal jugular venous V waves present [No Jugular Venous Quach A Waves] : no jugular venous quach A waves [Respiration, Rhythm And Depth] : normal respiratory rhythm and effort [Exaggerated Use Of Accessory Muscles For Inspiration] : no accessory muscle use [Auscultation Breath Sounds / Voice Sounds] : lungs were clear to auscultation bilaterally [Abdomen Tenderness] : non-tender [Abdomen Soft] : soft [Abdomen Mass (___ Cm)] : no abdominal mass palpated [Abnormal Walk] : normal gait [Cyanosis, Localized] : no localized cyanosis [Nail Clubbing] : no clubbing of the fingernails [Gait - Sufficient For Exercise Testing] : the gait was sufficient for exercise testing [Skin Color & Pigmentation] : normal skin color and pigmentation [Petechial Hemorrhages (___cm)] : no petechial hemorrhages [] : no rash [Skin Lesions] : no skin lesions [No Venous Stasis] : no venous stasis [No Xanthoma] : no  xanthoma was observed [No Skin Ulcers] : no skin ulcer [Oriented To Time, Place, And Person] : oriented to person, place, and time [No Anxiety] : not feeling anxious [Affect] : the affect was normal [Mood] : the mood was normal [Normal Rate] : normal [Rhythm Regular] : regular [Normal S2] : normal S2 [Normal S1] : normal S1 [No Murmur] : no murmurs heard [2+] : left 2+ [No Pitting Edema] : no pitting edema present [FreeTextEntry1] : + ecchymosis on right forearm [Right Carotid Bruit] : no bruit heard over the right carotid [Left Carotid Bruit] : no bruit heard over the left carotid [Bruit] : no bruit heard

## 2020-01-13 NOTE — HISTORY OF PRESENT ILLNESS
[FreeTextEntry1] : 58 y/o M smoker with CAD s/p NSTEMI in 9/2019 s/p PCI to RCA, HTN, nl LV.\par \par In 9/2019 he initially presented to [facility] initially with CP and found to have an NSTEMI. He was transferred to [facility] for cardiac cath for NSTEMI and underwent PCI to RCA.\par Echo on 9/23/19 at [facility]: EF 60%, trivial pericardial effusion, mild MR, mild\par thickening of the anterior and posterior mitral valve leaflets.\par \par He is now here for a followup visit. \par He has been feeling better overall. He has improved his diet. \par He   denies any chest pain, PND, orthopnea, lower extremity edema, near syncope, syncope, strokelike symptoms.  \par He is trying to walk more. He is walking at least 1 mile a day without any issues. \par He has still been smoking. He is now down to 3-5 cig a day.  He is compliant with his medications. He does not have any insurance yet but it will start in Feb.

## 2020-04-28 ENCOUNTER — TRANSCRIPTION ENCOUNTER (OUTPATIENT)
Age: 60
End: 2020-04-28

## 2020-07-17 ENCOUNTER — APPOINTMENT (OUTPATIENT)
Dept: CARDIOLOGY | Facility: CLINIC | Age: 60
End: 2020-07-17
Payer: COMMERCIAL

## 2020-07-17 ENCOUNTER — NON-APPOINTMENT (OUTPATIENT)
Age: 60
End: 2020-07-17

## 2020-07-17 VITALS
HEART RATE: 72 BPM | OXYGEN SATURATION: 96 % | HEIGHT: 67 IN | WEIGHT: 168 LBS | BODY MASS INDEX: 26.37 KG/M2 | SYSTOLIC BLOOD PRESSURE: 145 MMHG | DIASTOLIC BLOOD PRESSURE: 92 MMHG

## 2020-07-17 PROCEDURE — 99214 OFFICE O/P EST MOD 30 MIN: CPT

## 2020-07-17 PROCEDURE — 93000 ELECTROCARDIOGRAM COMPLETE: CPT

## 2020-07-17 RX ORDER — NICOTINE 21 MG/24H
21 PATCH, EXTENDED RELEASE TRANSDERMAL
Refills: 0 | Status: DISCONTINUED | COMMUNITY
End: 2020-07-17

## 2020-07-17 NOTE — HISTORY OF PRESENT ILLNESS
[FreeTextEntry1] : 59 y/o M smoker with CAD s/p NSTEMI in 9/2019 s/p PCI to RCA, HTN, nl LV.\par \par In 9/2019 he initially presented to [facility] initially with CP and found to have an NSTEMI. He was transferred to [facility] for cardiac cath for NSTEMI and underwent PCI to RCA.\par Echo on 9/23/19 at [facility]: EF 60%, trivial pericardial effusion, mild MR, mild\par thickening of the anterior and posterior mitral valve leaflets.\par \par He is now here for a followup visit. \par He has been feeling better overall. he has been quarantined at home because of the COVID pandemic. He was very stressed. He has been smoking more. He could not maintain a good diet. \par He   denies any chest pain, PND, orthopnea, lower extremity edema, near syncope, syncope, strokelike symptoms.  \par He is trying to walk more. He is walking at least 1 mile a day without any issues. He is compliant with his medications.

## 2020-07-17 NOTE — PHYSICAL EXAM
[Well Groomed] : well groomed [Normal Conjunctiva] : the conjunctiva exhibited no abnormalities [General Appearance - In No Acute Distress] : no acute distress [Normal Oral Mucosa] : normal oral mucosa [Eyelids - No Xanthelasma] : the eyelids demonstrated no xanthelasmas [No Oral Pallor] : no oral pallor [No Oral Cyanosis] : no oral cyanosis [Normal Jugular Venous A Waves Present] : normal jugular venous A waves present [Normal Jugular Venous V Waves Present] : normal jugular venous V waves present [No Jugular Venous Quach A Waves] : no jugular venous quach A waves [Respiration, Rhythm And Depth] : normal respiratory rhythm and effort [Exaggerated Use Of Accessory Muscles For Inspiration] : no accessory muscle use [Auscultation Breath Sounds / Voice Sounds] : lungs were clear to auscultation bilaterally [Abdomen Soft] : soft [Abdomen Tenderness] : non-tender [Abdomen Mass (___ Cm)] : no abdominal mass palpated [Abnormal Walk] : normal gait [Gait - Sufficient For Exercise Testing] : the gait was sufficient for exercise testing [Nail Clubbing] : no clubbing of the fingernails [Cyanosis, Localized] : no localized cyanosis [Petechial Hemorrhages (___cm)] : no petechial hemorrhages [Skin Color & Pigmentation] : normal skin color and pigmentation [No Venous Stasis] : no venous stasis [] : no rash [Skin Lesions] : no skin lesions [No Skin Ulcers] : no skin ulcer [No Xanthoma] : no  xanthoma was observed [FreeTextEntry1] : + ecchymosis on right forearm [Oriented To Time, Place, And Person] : oriented to person, place, and time [Affect] : the affect was normal [No Anxiety] : not feeling anxious [Mood] : the mood was normal [Normal Rate] : normal [Rhythm Regular] : regular [Normal S2] : normal S2 [Normal S1] : normal S1 [No Murmur] : no murmurs heard [Left Carotid Bruit] : no bruit heard over the left carotid [Right Carotid Bruit] : no bruit heard over the right carotid [2+] : right 2+ [No Pitting Edema] : no pitting edema present [Bruit] : no bruit heard

## 2020-07-17 NOTE — DISCUSSION/SUMMARY
[FreeTextEntry1] : 60 year man with a history as listed presents for a followup cardiac evaluation. \par \par Yonny is doing well overall. He denies any anginal symptoms. Clinically he is euvolemic on exam. His EKG did not reveal any significant ischemic changes. He had an echo at [facility] in 9/2019 that showed normal systolic LV function without any significant other findings, including no significant valvular disease. \par He has PCI to his RCA with a JORGE on  9/2019. He will continue DAPT till Sept 2020. Afterwards will stop the ASA. \par He will increase the Toprol to 50mg Qday. \par He will continue Lipitor 80mg HS. He will continue with statin therapy to achieve maintain goal LDL<100 or ideally <70. \par Smoking cessation counseling was performed for >3 minutes. He will try Chantix. \par Exercise and diet counseling was performed in order to reduce her future cardiovascular risk. \par He will followup with me in 3 months or sooner if necessary.  \par \par

## 2020-09-28 ENCOUNTER — RX RENEWAL (OUTPATIENT)
Age: 60
End: 2020-09-28

## 2020-10-23 ENCOUNTER — APPOINTMENT (OUTPATIENT)
Dept: CARDIOLOGY | Facility: CLINIC | Age: 60
End: 2020-10-23
Payer: COMMERCIAL

## 2020-10-23 ENCOUNTER — NON-APPOINTMENT (OUTPATIENT)
Age: 60
End: 2020-10-23

## 2020-10-23 VITALS
OXYGEN SATURATION: 94 % | DIASTOLIC BLOOD PRESSURE: 73 MMHG | WEIGHT: 177 LBS | BODY MASS INDEX: 27.78 KG/M2 | HEIGHT: 67 IN | SYSTOLIC BLOOD PRESSURE: 101 MMHG | HEART RATE: 68 BPM

## 2020-10-23 PROCEDURE — 99214 OFFICE O/P EST MOD 30 MIN: CPT

## 2020-10-23 PROCEDURE — 99072 ADDL SUPL MATRL&STAF TM PHE: CPT

## 2020-10-23 PROCEDURE — 93000 ELECTROCARDIOGRAM COMPLETE: CPT

## 2020-10-23 RX ORDER — CLOPIDOGREL BISULFATE 75 MG/1
75 TABLET, FILM COATED ORAL DAILY
Qty: 90 | Refills: 3 | Status: DISCONTINUED | COMMUNITY
End: 2020-10-23

## 2020-10-23 NOTE — DISCUSSION/SUMMARY
[FreeTextEntry1] : 60 year man with a history as listed presents for a followup cardiac evaluation. \par \par Yonny is doing well overall. He denies any anginal symptoms. His chest pain is nonanginal.  Clinically he is euvolemic on exam. His EKG did not reveal any significant ischemic changes. He will get a 2d echo to assess for any  new structural heart disease, changes in valvular and ventricular function. \par He has PCI to his RCA with a JORGE on  9/2019. He stop his Plavix and continue with ASA. \par He will continue Toprol  50mg Qday. \par He will continue Lipitor 80mg HS. He will continue with statin therapy to achieve maintain goal LDL<100 or ideally <70. \par He will continue Chantix for now.  \par Exercise and diet counseling was performed in order to reduce her future cardiovascular risk. \par He will followup with me in 3 months or sooner if necessary.  \par \par

## 2020-10-23 NOTE — PHYSICAL EXAM
[Well Groomed] : well groomed [General Appearance - In No Acute Distress] : no acute distress [Normal Conjunctiva] : the conjunctiva exhibited no abnormalities [Eyelids - No Xanthelasma] : the eyelids demonstrated no xanthelasmas [Normal Oral Mucosa] : normal oral mucosa [No Oral Pallor] : no oral pallor [No Oral Cyanosis] : no oral cyanosis [Normal Jugular Venous A Waves Present] : normal jugular venous A waves present [Normal Jugular Venous V Waves Present] : normal jugular venous V waves present [No Jugular Venous Quach A Waves] : no jugular venous quach A waves [Respiration, Rhythm And Depth] : normal respiratory rhythm and effort [Exaggerated Use Of Accessory Muscles For Inspiration] : no accessory muscle use [Auscultation Breath Sounds / Voice Sounds] : lungs were clear to auscultation bilaterally [Abdomen Soft] : soft [Abdomen Tenderness] : non-tender [Abdomen Mass (___ Cm)] : no abdominal mass palpated [Abnormal Walk] : normal gait [Gait - Sufficient For Exercise Testing] : the gait was sufficient for exercise testing [Nail Clubbing] : no clubbing of the fingernails [Cyanosis, Localized] : no localized cyanosis [Petechial Hemorrhages (___cm)] : no petechial hemorrhages [Skin Color & Pigmentation] : normal skin color and pigmentation [] : no rash [No Venous Stasis] : no venous stasis [Skin Lesions] : no skin lesions [No Skin Ulcers] : no skin ulcer [No Xanthoma] : no  xanthoma was observed [FreeTextEntry1] : + ecchymosis on right forearm [Oriented To Time, Place, And Person] : oriented to person, place, and time [Affect] : the affect was normal [Mood] : the mood was normal [No Anxiety] : not feeling anxious [Normal Rate] : normal [Rhythm Regular] : regular [Normal S1] : normal S1 [Normal S2] : normal S2 [No Murmur] : no murmurs heard [Right Carotid Bruit] : no bruit heard over the right carotid [Left Carotid Bruit] : no bruit heard over the left carotid [2+] : left 2+ [Bruit] : no bruit heard [No Pitting Edema] : no pitting edema present

## 2020-10-23 NOTE — HISTORY OF PRESENT ILLNESS
[FreeTextEntry1] : 59 y/o M smoker with CAD s/p NSTEMI in 9/2019 s/p PCI to RCA, HTN, nl LV.\par \par In 9/2019 he initially presented to [facility] initially with CP and found to have an NSTEMI. He was transferred to [facility] for cardiac cath for NSTEMI and underwent PCI to RCA.\par Echo on 9/23/19 at [facility]: EF 60%, trivial pericardial effusion, mild MR, mild thickening of the anterior and posterior mitral valve leaflets.\par \par He is now here for a followup visit. \par He has been feeling better overall. he has been on Chantix. He has not smoked since late August.  His dyspnea on exertion has improved greatly since stopping. Intermittently he has a left upper chest ache, that happens randomly. happening about 1-2 times  a week,  nonexertional, nonradiating and self limiting to seconds. \par He   denies any  PND, orthopnea, lower extremity edema, near syncope, syncope, strokelike symptoms.  \par  He is compliant with his medications.

## 2020-10-27 LAB
25(OH)D3 SERPL-MCNC: 25.4 NG/ML
ALBUMIN SERPL ELPH-MCNC: 4.5 G/DL
ALP BLD-CCNC: 49 U/L
ALT SERPL-CCNC: 54 U/L
ANION GAP SERPL CALC-SCNC: 14 MMOL/L
AST SERPL-CCNC: 25 U/L
BASOPHILS # BLD AUTO: 0.05 K/UL
BASOPHILS NFR BLD AUTO: 0.5 %
BILIRUB SERPL-MCNC: 0.3 MG/DL
BUN SERPL-MCNC: 16 MG/DL
CALCIUM SERPL-MCNC: 9.7 MG/DL
CHLORIDE SERPL-SCNC: 104 MMOL/L
CHOLEST SERPL-MCNC: 169 MG/DL
CO2 SERPL-SCNC: 25 MMOL/L
CREAT SERPL-MCNC: 1.06 MG/DL
EOSINOPHIL # BLD AUTO: 0.17 K/UL
EOSINOPHIL NFR BLD AUTO: 1.7 %
ESTIMATED AVERAGE GLUCOSE: 117 MG/DL
FOLATE SERPL-MCNC: 9.6 NG/ML
GLUCOSE SERPL-MCNC: 106 MG/DL
HBA1C MFR BLD HPLC: 5.7 %
HCT VFR BLD CALC: 47 %
HDLC SERPL-MCNC: 38 MG/DL
HGB BLD-MCNC: 15.1 G/DL
IMM GRANULOCYTES NFR BLD AUTO: 0.6 %
LDLC SERPL CALC-MCNC: 93 MG/DL
LYMPHOCYTES # BLD AUTO: 2.98 K/UL
LYMPHOCYTES NFR BLD AUTO: 30.3 %
MAN DIFF?: NORMAL
MCHC RBC-ENTMCNC: 31 PG
MCHC RBC-ENTMCNC: 32.1 GM/DL
MCV RBC AUTO: 96.5 FL
MONOCYTES # BLD AUTO: 0.68 K/UL
MONOCYTES NFR BLD AUTO: 6.9 %
NEUTROPHILS # BLD AUTO: 5.9 K/UL
NEUTROPHILS NFR BLD AUTO: 60 %
NONHDLC SERPL-MCNC: 131 MG/DL
PLATELET # BLD AUTO: 281 K/UL
POTASSIUM SERPL-SCNC: 4.6 MMOL/L
PROT SERPL-MCNC: 6.9 G/DL
RBC # BLD: 4.87 M/UL
RBC # FLD: 12.6 %
SODIUM SERPL-SCNC: 142 MMOL/L
TRIGL SERPL-MCNC: 188 MG/DL
TSH SERPL-ACNC: 1.92 UIU/ML
VIT B12 SERPL-MCNC: 288 PG/ML
WBC # FLD AUTO: 9.84 K/UL

## 2020-11-09 ENCOUNTER — RX CHANGE (OUTPATIENT)
Age: 60
End: 2020-11-09

## 2020-11-13 ENCOUNTER — APPOINTMENT (OUTPATIENT)
Dept: CARDIOLOGY | Facility: CLINIC | Age: 60
End: 2020-11-13
Payer: COMMERCIAL

## 2020-11-13 PROCEDURE — 93306 TTE W/DOPPLER COMPLETE: CPT

## 2020-11-17 ENCOUNTER — TRANSCRIPTION ENCOUNTER (OUTPATIENT)
Age: 60
End: 2020-11-17

## 2020-12-18 ENCOUNTER — RX RENEWAL (OUTPATIENT)
Age: 60
End: 2020-12-18

## 2021-01-04 ENCOUNTER — RX RENEWAL (OUTPATIENT)
Age: 61
End: 2021-01-04

## 2021-01-13 ENCOUNTER — TRANSCRIPTION ENCOUNTER (OUTPATIENT)
Age: 61
End: 2021-01-13

## 2021-02-02 ENCOUNTER — APPOINTMENT (OUTPATIENT)
Dept: CARDIOLOGY | Facility: CLINIC | Age: 61
End: 2021-02-02
Payer: COMMERCIAL

## 2021-02-02 ENCOUNTER — TRANSCRIPTION ENCOUNTER (OUTPATIENT)
Age: 61
End: 2021-02-02

## 2021-03-30 ENCOUNTER — RX RENEWAL (OUTPATIENT)
Age: 61
End: 2021-03-30

## 2021-04-20 ENCOUNTER — APPOINTMENT (OUTPATIENT)
Dept: CARDIOLOGY | Facility: CLINIC | Age: 61
End: 2021-04-20
Payer: COMMERCIAL

## 2021-04-20 ENCOUNTER — NON-APPOINTMENT (OUTPATIENT)
Age: 61
End: 2021-04-20

## 2021-04-20 VITALS
DIASTOLIC BLOOD PRESSURE: 77 MMHG | HEIGHT: 67 IN | OXYGEN SATURATION: 93 % | BODY MASS INDEX: 29.35 KG/M2 | SYSTOLIC BLOOD PRESSURE: 130 MMHG | HEART RATE: 83 BPM | WEIGHT: 187 LBS

## 2021-04-20 VITALS — DIASTOLIC BLOOD PRESSURE: 70 MMHG | SYSTOLIC BLOOD PRESSURE: 124 MMHG

## 2021-04-20 PROCEDURE — 99214 OFFICE O/P EST MOD 30 MIN: CPT

## 2021-04-20 PROCEDURE — 99072 ADDL SUPL MATRL&STAF TM PHE: CPT

## 2021-04-20 PROCEDURE — 93000 ELECTROCARDIOGRAM COMPLETE: CPT

## 2021-04-20 NOTE — DISCUSSION/SUMMARY
[FreeTextEntry1] : 61 year man with a history as listed presents for a followup cardiac evaluation. \par \par Yonny is doing well overall. He denies any anginal symptoms. Clinically he is euvolemic on exam. His EKG did not reveal any significant ischemic changes. He had an echo in 11/2020 that showed normal systolic LV function with mild to moderate MR. \par He has PCI to his RCA with a JORGE on  9/2019. He stop his Plavix and continue with ASA. \par He will continue Toprol  50mg Qday. \par He will continue Lipitor 80mg HS. He will continue with statin therapy to achieve maintain goal LDL<100 or ideally <70. \par He will continue Chantix for now.  \par Exercise and diet counseling was performed in order to reduce her future cardiovascular risk. \par He will followup with me in 6 months or sooner if necessary.  \par \par

## 2021-04-20 NOTE — PHYSICAL EXAM
[Well Groomed] : well groomed [General Appearance - In No Acute Distress] : no acute distress [Normal Conjunctiva] : the conjunctiva exhibited no abnormalities [Eyelids - No Xanthelasma] : the eyelids demonstrated no xanthelasmas [Normal Oral Mucosa] : normal oral mucosa [No Oral Pallor] : no oral pallor [No Oral Cyanosis] : no oral cyanosis [Normal Jugular Venous A Waves Present] : normal jugular venous A waves present [Normal Jugular Venous V Waves Present] : normal jugular venous V waves present [No Jugular Venous Quach A Waves] : no jugular venous quach A waves [Respiration, Rhythm And Depth] : normal respiratory rhythm and effort [Exaggerated Use Of Accessory Muscles For Inspiration] : no accessory muscle use [Auscultation Breath Sounds / Voice Sounds] : lungs were clear to auscultation bilaterally [Abdomen Soft] : soft [Abdomen Tenderness] : non-tender [Abdomen Mass (___ Cm)] : no abdominal mass palpated [Abnormal Walk] : normal gait [Gait - Sufficient For Exercise Testing] : the gait was sufficient for exercise testing [Nail Clubbing] : no clubbing of the fingernails [Cyanosis, Localized] : no localized cyanosis [Petechial Hemorrhages (___cm)] : no petechial hemorrhages [Skin Color & Pigmentation] : normal skin color and pigmentation [] : no rash [No Venous Stasis] : no venous stasis [Skin Lesions] : no skin lesions [No Skin Ulcers] : no skin ulcer [No Xanthoma] : no  xanthoma was observed [Oriented To Time, Place, And Person] : oriented to person, place, and time [Affect] : the affect was normal [Mood] : the mood was normal [No Anxiety] : not feeling anxious [Normal Rate] : normal [Rhythm Regular] : regular [Normal S1] : normal S1 [Normal S2] : normal S2 [No Murmur] : no murmurs heard [2+] : left 2+ [No Pitting Edema] : no pitting edema present [FreeTextEntry1] : + ecchymosis on right forearm [Right Carotid Bruit] : no bruit heard over the right carotid [Left Carotid Bruit] : no bruit heard over the left carotid [Bruit] : no bruit heard

## 2021-04-20 NOTE — HISTORY OF PRESENT ILLNESS
[FreeTextEntry1] : 59 y/o M smoker with CAD s/p NSTEMI in 9/2019 s/p PCI to RCA, HTN, nl LV.\par \par In 9/2019 he initially presented to [facility] initially with CP and found to have an NSTEMI. He was transferred to [facility] for cardiac cath for NSTEMI and underwent PCI to RCA.\par Echo on 9/23/19 at [facility]: EF 60%, trivial pericardial effusion, mild MR, mild thickening of the anterior and posterior mitral valve leaflets.\par He had an echo in 11/2020 that showed normal systolic LV function with mild to moderate MR. \par \par He is now here for a followup visit. \par He has been feeling better overall. he has been on Chantix. He has not smoked since late August.  He has been relatively sedentary. He has mild TAYLOR but it has improved overall. He   denies any chest pain,  PND, orthopnea, lower extremity edema, near syncope, syncope, strokelike symptoms.  \par  He is compliant with his medications.

## 2021-07-06 ENCOUNTER — RX RENEWAL (OUTPATIENT)
Age: 61
End: 2021-07-06

## 2021-09-17 ENCOUNTER — APPOINTMENT (OUTPATIENT)
Dept: CARDIOLOGY | Facility: CLINIC | Age: 61
End: 2021-09-17
Payer: COMMERCIAL

## 2021-09-17 ENCOUNTER — NON-APPOINTMENT (OUTPATIENT)
Age: 61
End: 2021-09-17

## 2021-09-17 VITALS
SYSTOLIC BLOOD PRESSURE: 123 MMHG | DIASTOLIC BLOOD PRESSURE: 80 MMHG | BODY MASS INDEX: 29.51 KG/M2 | HEART RATE: 72 BPM | OXYGEN SATURATION: 95 % | HEIGHT: 67 IN | WEIGHT: 188 LBS

## 2021-09-17 PROCEDURE — 93000 ELECTROCARDIOGRAM COMPLETE: CPT

## 2021-09-17 PROCEDURE — 99214 OFFICE O/P EST MOD 30 MIN: CPT

## 2021-09-17 RX ORDER — VARENICLINE TARTRATE 1 MG/1
1 TABLET, FILM COATED ORAL
Qty: 56 | Refills: 2 | Status: DISCONTINUED | COMMUNITY
Start: 2020-09-28 | End: 2021-09-17

## 2021-09-17 RX ORDER — VARENICLINE TARTRATE 1 MG/1
1 TABLET, FILM COATED ORAL
Qty: 1 | Refills: 2 | Status: DISCONTINUED | COMMUNITY
Start: 2020-07-17 | End: 2021-09-17

## 2021-09-17 NOTE — DISCUSSION/SUMMARY
[FreeTextEntry1] : 61 year man with a history as listed presents for a followup cardiac evaluation. \par \par Yonny is doing well overall. He denies any anginal symptoms. Clinically he is euvolemic on exam. His EKG did not reveal any significant ischemic changes. He had an echo in 11/2020 that showed normal systolic LV function with mild to moderate MR. \par He has PCI to his RCA with a JORGE on  9/2019. He stop his Plavix and continue with ASA. He will get a screening carotid. \par He will continue Toprol  50mg Qday. \par He will continue Lipitor 80mg HS. He will continue with statin therapy to achieve maintain goal LDL<100 or ideally <70. \par He will have his baseline lab work, including lipids, done prior to the next visit. \par Exercise and diet counseling was performed in order to reduce her future cardiovascular risk. \par He will followup with me in 6 months or sooner if necessary.  \par \par

## 2021-09-17 NOTE — HISTORY OF PRESENT ILLNESS
[FreeTextEntry1] : 60 y/o M smoker with CAD s/p NSTEMI in 9/2019 s/p PCI to RCA, HTN, nl LV.\par \par In 9/2019 he initially presented to [facility] initially with CP and found to have an NSTEMI. He was transferred to [facility] for cardiac cath for NSTEMI and underwent PCI to RCA.\par Echo on 9/23/19 at [facility]: EF 60%, trivial pericardial effusion, mild MR, mild thickening of the anterior and posterior mitral valve leaflets.\par He had an echo in 11/2020 that showed normal systolic LV function with mild to moderate MR. \par \par He is now here for a followup visit. \par He is planning to retire early in June. \par He has been feeling better overall. He is walking about 1 mile a day at least 3 times a week.  He has mild TAYLOR but it has improved overall. He   denies any chest pain,  PND, orthopnea, lower extremity edema, near syncope, syncope, strokelike symptoms.   He is compliant with his medications.  He has stayed off smoking.

## 2021-09-17 NOTE — CARDIOLOGY SUMMARY
[de-identified] : Sinus  Rhythm \par -Incomplete right bundle branch block. \par  [de-identified] : 11/2020 normal LV function mild moderate MR.

## 2021-10-18 ENCOUNTER — APPOINTMENT (OUTPATIENT)
Dept: CARDIOLOGY | Facility: CLINIC | Age: 61
End: 2021-10-18
Payer: COMMERCIAL

## 2021-10-18 PROCEDURE — 93880 EXTRACRANIAL BILAT STUDY: CPT

## 2021-11-11 LAB
25(OH)D3 SERPL-MCNC: 34.9 NG/ML
ALBUMIN SERPL ELPH-MCNC: 4.7 G/DL
ALP BLD-CCNC: 52 U/L
ALT SERPL-CCNC: 43 U/L
ANION GAP SERPL CALC-SCNC: 18 MMOL/L
AST SERPL-CCNC: 20 U/L
BASOPHILS # BLD AUTO: 0.04 K/UL
BASOPHILS NFR BLD AUTO: 0.5 %
BILIRUB SERPL-MCNC: 0.2 MG/DL
BUN SERPL-MCNC: 26 MG/DL
CALCIUM SERPL-MCNC: 9.9 MG/DL
CHLORIDE SERPL-SCNC: 106 MMOL/L
CHOLEST SERPL-MCNC: 203 MG/DL
CO2 SERPL-SCNC: 19 MMOL/L
CREAT SERPL-MCNC: 1.12 MG/DL
EOSINOPHIL # BLD AUTO: 0.15 K/UL
EOSINOPHIL NFR BLD AUTO: 1.7 %
ESTIMATED AVERAGE GLUCOSE: 117 MG/DL
FOLATE SERPL-MCNC: >20 NG/ML
GLUCOSE SERPL-MCNC: 106 MG/DL
HBA1C MFR BLD HPLC: 5.7 %
HCT VFR BLD CALC: 44.1 %
HDLC SERPL-MCNC: 41 MG/DL
HGB BLD-MCNC: 14.7 G/DL
IMM GRANULOCYTES NFR BLD AUTO: 0.5 %
LDLC SERPL CALC-MCNC: 112 MG/DL
LYMPHOCYTES # BLD AUTO: 2.72 K/UL
LYMPHOCYTES NFR BLD AUTO: 30.9 %
MAN DIFF?: NORMAL
MCHC RBC-ENTMCNC: 30.3 PG
MCHC RBC-ENTMCNC: 33.3 GM/DL
MCV RBC AUTO: 90.9 FL
MONOCYTES # BLD AUTO: 0.72 K/UL
MONOCYTES NFR BLD AUTO: 8.2 %
NEUTROPHILS # BLD AUTO: 5.13 K/UL
NEUTROPHILS NFR BLD AUTO: 58.2 %
NONHDLC SERPL-MCNC: 162 MG/DL
NT-PROBNP SERPL-MCNC: 14 PG/ML
PLATELET # BLD AUTO: 269 K/UL
POTASSIUM SERPL-SCNC: 4.8 MMOL/L
PROT SERPL-MCNC: 7.3 G/DL
RBC # BLD: 4.85 M/UL
RBC # FLD: 12.4 %
SODIUM SERPL-SCNC: 143 MMOL/L
TRIGL SERPL-MCNC: 252 MG/DL
TSH SERPL-ACNC: 1.82 UIU/ML
VIT B12 SERPL-MCNC: 1430 PG/ML
WBC # FLD AUTO: 8.8 K/UL

## 2021-12-22 ENCOUNTER — RX RENEWAL (OUTPATIENT)
Age: 61
End: 2021-12-22

## 2022-03-07 ENCOUNTER — RX RENEWAL (OUTPATIENT)
Age: 62
End: 2022-03-07

## 2022-04-29 ENCOUNTER — APPOINTMENT (OUTPATIENT)
Dept: CARDIOLOGY | Facility: CLINIC | Age: 62
End: 2022-04-29

## 2022-06-29 ENCOUNTER — NON-APPOINTMENT (OUTPATIENT)
Age: 62
End: 2022-06-29

## 2022-06-29 ENCOUNTER — APPOINTMENT (OUTPATIENT)
Dept: CARDIOLOGY | Facility: CLINIC | Age: 62
End: 2022-06-29
Payer: MEDICAID

## 2022-06-29 VITALS
HEIGHT: 67 IN | DIASTOLIC BLOOD PRESSURE: 82 MMHG | HEART RATE: 78 BPM | SYSTOLIC BLOOD PRESSURE: 131 MMHG | OXYGEN SATURATION: 93 % | WEIGHT: 202 LBS | BODY MASS INDEX: 31.71 KG/M2

## 2022-06-29 VITALS — SYSTOLIC BLOOD PRESSURE: 122 MMHG | DIASTOLIC BLOOD PRESSURE: 70 MMHG

## 2022-06-29 DIAGNOSIS — F17.200 NICOTINE DEPENDENCE, UNSPECIFIED, UNCOMPLICATED: ICD-10-CM

## 2022-06-29 DIAGNOSIS — I25.10 ATHEROSCLEROTIC HEART DISEASE OF NATIVE CORONARY ARTERY W/OUT ANGINA PECTORIS: ICD-10-CM

## 2022-06-29 DIAGNOSIS — Z87.891 PERSONAL HISTORY OF NICOTINE DEPENDENCE: ICD-10-CM

## 2022-06-29 DIAGNOSIS — E78.5 HYPERLIPIDEMIA, UNSPECIFIED: ICD-10-CM

## 2022-06-29 PROCEDURE — 99214 OFFICE O/P EST MOD 30 MIN: CPT

## 2022-06-29 PROCEDURE — 93000 ELECTROCARDIOGRAM COMPLETE: CPT

## 2022-06-29 RX ORDER — ATORVASTATIN CALCIUM 80 MG/1
80 TABLET, FILM COATED ORAL
Qty: 90 | Refills: 3 | Status: DISCONTINUED | COMMUNITY
Start: 2021-12-22 | End: 2022-06-29

## 2022-06-29 NOTE — DISCUSSION/SUMMARY
[FreeTextEntry1] : 62 year man with a history as listed presents for a followup cardiac evaluation. \par \par Yonny is doing well overall. He denies any anginal symptoms. Clinically he is euvolemic on exam. His EKG did not reveal any significant ischemic changes. He had an echo in 11/2020 that showed normal systolic LV function with mild to moderate MR. He will need a repeat echo prior to his next visit. \par He has PCI to his RCA with a JORGE on  9/2019. He stop his Plavix and continue with ASA. \par He will continue Toprol  50mg Qday. \par He will continue Crestor 40mg HS. He will continue with statin therapy to achieve maintain goal LDL<100 or ideally <70. \par He will have his baseline lab work, including lipids, done prior to the next visit. \par Exercise and diet counseling was performed in order to reduce her future cardiovascular risk. \par He will followup with me in 12  months or sooner if necessary.  \par \par

## 2022-06-29 NOTE — CARDIOLOGY SUMMARY
[de-identified] : Sinus  Rhythm \par -Incomplete right bundle branch block. \par  [de-identified] : 11/2020 normal LV function mild moderate MR.  [de-identified] : 10/18/2021 mild carotid disease

## 2022-06-29 NOTE — PHYSICAL EXAM
[Well Groomed] : well groomed [General Appearance - In No Acute Distress] : no acute distress [Normal Conjunctiva] : the conjunctiva exhibited no abnormalities [Eyelids - No Xanthelasma] : the eyelids demonstrated no xanthelasmas [Normal Oral Mucosa] : normal oral mucosa [No Oral Pallor] : no oral pallor [No Oral Cyanosis] : no oral cyanosis [Normal Jugular Venous A Waves Present] : normal jugular venous A waves present [Normal Jugular Venous V Waves Present] : normal jugular venous V waves present [No Jugular Venous Quach A Waves] : no jugular venous quach A waves [Respiration, Rhythm And Depth] : normal respiratory rhythm and effort [Exaggerated Use Of Accessory Muscles For Inspiration] : no accessory muscle use [Auscultation Breath Sounds / Voice Sounds] : lungs were clear to auscultation bilaterally [Abdomen Soft] : soft [Abdomen Tenderness] : non-tender [Abdomen Mass (___ Cm)] : no abdominal mass palpated [Abnormal Walk] : normal gait [Gait - Sufficient For Exercise Testing] : the gait was sufficient for exercise testing [Nail Clubbing] : no clubbing of the fingernails [Cyanosis, Localized] : no localized cyanosis [Petechial Hemorrhages (___cm)] : no petechial hemorrhages [Skin Color & Pigmentation] : normal skin color and pigmentation [] : no rash [No Venous Stasis] : no venous stasis [Skin Lesions] : no skin lesions [No Xanthoma] : no  xanthoma was observed [No Skin Ulcers] : no skin ulcer [Oriented To Time, Place, And Person] : oriented to person, place, and time [Affect] : the affect was normal [Mood] : the mood was normal [No Anxiety] : not feeling anxious [Normal Rate] : normal [Rhythm Regular] : regular [Normal S1] : normal S1 [Normal S2] : normal S2 [No Murmur] : no murmurs heard [2+] : left 2+ [No Pitting Edema] : no pitting edema present [FreeTextEntry1] : + ecchymosis on right forearm [Right Carotid Bruit] : no bruit heard over the right carotid [Bruit] : no bruit heard [Left Carotid Bruit] : no bruit heard over the left carotid

## 2022-06-29 NOTE — HISTORY OF PRESENT ILLNESS
[FreeTextEntry1] : 61 y/o M smoker with CAD s/p NSTEMI in 9/2019 s/p PCI to RCA, HTN, nl LV.\par \par In 9/2019 he initially presented to [facility] initially with CP and found to have an NSTEMI. He was transferred to [facility] for cardiac cath for NSTEMI and underwent PCI to RCA.\par Echo on 9/23/19 at [facility]: EF 60%, trivial pericardial effusion, mild MR, mild thickening of the anterior and posterior mitral valve leaflets.\par He had an echo in 11/2020 that showed normal systolic LV function with mild to moderate MR. \par \par He is now here for a followup visit. \par He is now retired. He is moving to [place] next week. He has been very sedentary and not maintaining a good diet. \par He has mild dyspnea on more strenuous exertion but it has improved overall. He   denies any chest pain,  PND, orthopnea, lower extremity edema, near syncope, syncope, strokelike symptoms.   He is compliant with his medications.  He has stayed off smoking.

## 2022-09-22 ENCOUNTER — TRANSCRIPTION ENCOUNTER (OUTPATIENT)
Age: 62
End: 2022-09-22

## 2022-10-04 ENCOUNTER — TRANSCRIPTION ENCOUNTER (OUTPATIENT)
Age: 62
End: 2022-10-04

## 2022-12-19 RX ORDER — METOPROLOL SUCCINATE 50 MG/1
50 TABLET, EXTENDED RELEASE ORAL
Qty: 90 | Refills: 3 | Status: ACTIVE | COMMUNITY
Start: 2021-01-04

## 2022-12-19 RX ORDER — ROSUVASTATIN CALCIUM 40 MG/1
40 TABLET, FILM COATED ORAL
Qty: 90 | Refills: 3 | Status: ACTIVE | COMMUNITY
Start: 2021-11-11

## 2023-05-05 ENCOUNTER — TRANSCRIPTION ENCOUNTER (OUTPATIENT)
Age: 63
End: 2023-05-05

## 2023-06-06 ENCOUNTER — TRANSCRIPTION ENCOUNTER (OUTPATIENT)
Age: 63
End: 2023-06-06

## 2023-06-19 ENCOUNTER — APPOINTMENT (OUTPATIENT)
Dept: CARDIOLOGY | Facility: CLINIC | Age: 63
End: 2023-06-19
Payer: COMMERCIAL

## 2023-06-19 ENCOUNTER — NON-APPOINTMENT (OUTPATIENT)
Age: 63
End: 2023-06-19

## 2023-06-19 VITALS
SYSTOLIC BLOOD PRESSURE: 155 MMHG | WEIGHT: 173 LBS | HEIGHT: 67 IN | BODY MASS INDEX: 27.15 KG/M2 | DIASTOLIC BLOOD PRESSURE: 82 MMHG | OXYGEN SATURATION: 97 % | HEART RATE: 66 BPM

## 2023-06-19 VITALS — DIASTOLIC BLOOD PRESSURE: 70 MMHG | SYSTOLIC BLOOD PRESSURE: 124 MMHG

## 2023-06-19 PROCEDURE — 93000 ELECTROCARDIOGRAM COMPLETE: CPT

## 2023-06-19 PROCEDURE — 99214 OFFICE O/P EST MOD 30 MIN: CPT | Mod: 25

## 2023-06-19 NOTE — HISTORY OF PRESENT ILLNESS
[FreeTextEntry1] : 61 y/o M smoker with CAD s/p NSTEMI in 9/2019 s/p PCI to RCA, HTN, nl LV, COPD. \par \par In 9/2019 he initially presented to [facility] initially with CP and found to have an NSTEMI. He was transferred to [facility] for cardiac cath for NSTEMI and underwent PCI to RCA.\par Echo on 9/23/19 at [facility]: EF 60%, trivial pericardial effusion, mild MR, mild thickening of the anterior and posterior mitral valve leaflets.\par He had an echo in 11/2020 that showed normal systolic LV function with mild to moderate MR. \par \par He is now here for a followup visit. \par he is now retired and living in [place]. He is much more active. He has been going to the gym and walking more.  \par   He   denies any chest pain,  dyspnea, PND, orthopnea, lower extremity edema, near syncope, syncope, strokelike symptoms.   He is compliant with his medications.  He has stayed off smoking.

## 2023-06-19 NOTE — DISCUSSION/SUMMARY
[FreeTextEntry1] : 63 year man with a history as listed presents for a followup cardiac evaluation. \par \par Yonny is doing well overall. He denies any anginal symptoms. Clinically he is euvolemic on exam. His EKG did not reveal any significant ischemic changes. He had an echo in 11/2020 that showed normal systolic LV function with mild to moderate MR. He will need a repeat echo prior to his next visit. \par He has PCI to his RCA with a JORGE on  9/2019. He stop his Plavix and continue with ASA. \par He will continue Toprol  50mg Qday. \par He will continue Crestor 40mg HS. He will continue with statin therapy to achieve maintain goal LDL<100 or ideally <70. \par Exercise and diet counseling was performed in order to reduce her future cardiovascular risk. \par He will followup with me in 12  months or sooner if necessary.  \par \par  [EKG obtained to assist in diagnosis and management of assessed problem(s)] : EKG obtained to assist in diagnosis and management of assessed problem(s)

## 2023-06-19 NOTE — CARDIOLOGY SUMMARY
[de-identified] : Sinus  Rhythm \par -Incomplete right bundle branch block. \par  [de-identified] : 11/2020 normal LV function mild moderate MR.  [de-identified] : 10/18/2021 mild carotid disease